# Patient Record
Sex: MALE | Race: WHITE | NOT HISPANIC OR LATINO | URBAN - METROPOLITAN AREA
[De-identification: names, ages, dates, MRNs, and addresses within clinical notes are randomized per-mention and may not be internally consistent; named-entity substitution may affect disease eponyms.]

---

## 2020-10-28 ENCOUNTER — OFFICE (OUTPATIENT)
Dept: URBAN - METROPOLITAN AREA CLINIC 79 | Facility: CLINIC | Age: 55
End: 2020-10-28
Payer: MEDICAID

## 2020-10-28 VITALS
HEART RATE: 82 BPM | SYSTOLIC BLOOD PRESSURE: 117 MMHG | DIASTOLIC BLOOD PRESSURE: 70 MMHG | WEIGHT: 218 LBS | HEIGHT: 72 IN | TEMPERATURE: 97.3 F

## 2020-10-28 DIAGNOSIS — R74.8 ABNORMAL LEVELS OF OTHER SERUM ENZYMES: ICD-10-CM

## 2020-10-28 DIAGNOSIS — R53.83 OTHER FATIGUE: ICD-10-CM

## 2020-10-28 DIAGNOSIS — Z20.5 CONTACT WITH AND (SUSPECTED) EXPOSURE TO VIRAL HEPATITIS: ICD-10-CM

## 2020-10-28 PROCEDURE — 99204 OFFICE O/P NEW MOD 45 MIN: CPT | Performed by: PHYSICIAN ASSISTANT

## 2020-10-28 NOTE — SERVICEHPINOTES
LOIS HARRIS   is a   55   year old male who is being seen in consultation at the request of   EMMA SAMSON   for hepatitis C. He notes a h/o jaundice at age 13 with diagnosis of non-A non-B hepatitis, though he doesn't recall how he could have acquired it. Donated blood about 20 years ago and was told he had Hep C. He followed up at that time and was told he had cleared it. He has h/o IVDU in his 20s and 30s. Was on methadone for 10 or so years and has been off this for 3 years. 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 Has chronic fatigue and some fibromyalgia symptoms for a long time. Had some elevation in his AST liver enzyme in July and then had a f/u Hep panel in September showing Hep C antibody at >11. No other concerns today. Had a colonoscopy circa 2007 due to possible diverticulitis. Due for colon cancer screening- has a kit (Cologuard?) at home he was planning to do but will reconsider and check with his insurance on doing a colonoscopy instead. Reports normal bowel habits.  9/14/20 Hep C ab >11, HBsAg neg, HBsAb neg, EMMANUEL neg, CRP neg, ESR neg, AST 32BRJuly 2020 AST 74 per PCP notes